# Patient Record
Sex: FEMALE | Race: WHITE
[De-identification: names, ages, dates, MRNs, and addresses within clinical notes are randomized per-mention and may not be internally consistent; named-entity substitution may affect disease eponyms.]

---

## 2019-04-22 ENCOUNTER — HOSPITAL ENCOUNTER (EMERGENCY)
Dept: HOSPITAL 74 - JER | Age: 24
LOS: 1 days | Discharge: HOME | End: 2019-04-23
Payer: COMMERCIAL

## 2019-04-22 VITALS — BODY MASS INDEX: 20.1 KG/M2
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
## 2019-04-22 DIAGNOSIS — R55: Primary | ICD-10-CM
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
## 2019-04-22 DIAGNOSIS — S09.8XXA: ICD-10-CM
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
## 2019-04-22 LAB
ALBUMIN SERPL-MCNC: 4.1 G/DL (ref 3.4–5)
ALP SERPL-CCNC: 64 U/L (ref 45–117)
ALT SERPL-CCNC: 16 U/L (ref 13–61)
ANION GAP SERPL CALC-SCNC: 9 MMOL/L (ref 8–16)
APPEARANCE UR: (no result)
AST SERPL-CCNC: 18 U/L (ref 15–37)
BASOPHILS # BLD: 0.8 % (ref 0–2)
BILIRUB SERPL-MCNC: 0.2 MG/DL (ref 0.2–1)
BILIRUB UR STRIP.AUTO-MCNC: NEGATIVE MG/DL
BUN SERPL-MCNC: 10 MG/DL (ref 7–18)
CALCIUM SERPL-MCNC: 8.8 MG/DL (ref 8.5–10.1)
CHLORIDE SERPL-SCNC: 104 MMOL/L (ref 98–107)
CO2 SERPL-SCNC: 24 MMOL/L (ref 21–32)
COLOR UR: YELLOW
CREAT SERPL-MCNC: 0.5 MG/DL (ref 0.55–1.3)
DEPRECATED RDW RBC AUTO: 13.2 % (ref 11.6–15.6)
EOSINOPHIL # BLD: 0.5 % (ref 0–4.5)
GLUCOSE SERPL-MCNC: 80 MG/DL (ref 74–106)
HCT VFR BLD CALC: 39.1 % (ref 32.4–45.2)
HGB BLD-MCNC: 13.6 GM/DL (ref 10.7–15.3)
KETONES UR QL STRIP: (no result)
LEUKOCYTE ESTERASE UR QL STRIP.AUTO: NEGATIVE
LYMPHOCYTES # BLD: 24.3 % (ref 8–40)
MCH RBC QN AUTO: 33.3 PG (ref 25.7–33.7)
MCHC RBC AUTO-ENTMCNC: 34.8 G/DL (ref 32–36)
MCV RBC: 95.9 FL (ref 80–96)
MONOCYTES # BLD AUTO: 8.3 % (ref 3.8–10.2)
NEUTROPHILS # BLD: 66.1 % (ref 42.8–82.8)
NITRITE UR QL STRIP: NEGATIVE
PH UR: 5.5 [PH] (ref 5–8)
PLATELET # BLD AUTO: 207 K/MM3 (ref 134–434)
PMV BLD: 8.8 FL (ref 7.5–11.1)
POTASSIUM SERPLBLD-SCNC: 4.2 MMOL/L (ref 3.5–5.1)
PROT SERPL-MCNC: 6.9 G/DL (ref 6.4–8.2)
PROT UR QL STRIP: NEGATIVE
PROT UR QL STRIP: NEGATIVE
RBC # BLD AUTO: 4.08 M/MM3 (ref 3.6–5.2)
SODIUM SERPL-SCNC: 137 MMOL/L (ref 136–145)
SP GR UR: 1.03 (ref 1.01–1.03)
UROBILINOGEN UR STRIP-MCNC: 1 MG/DL (ref 0.2–1)
WBC # BLD AUTO: 10.1 K/MM3 (ref 4–10)

## 2019-04-22 PROCEDURE — 3E033GC INTRODUCTION OF OTHER THERAPEUTIC SUBSTANCE INTO PERIPHERAL VEIN, PERCUTANEOUS APPROACH: ICD-10-PCS

## 2019-04-22 PROCEDURE — 3E0337Z INTRODUCTION OF ELECTROLYTIC AND WATER BALANCE SUBSTANCE INTO PERIPHERAL VEIN, PERCUTANEOUS APPROACH: ICD-10-PCS

## 2019-04-22 NOTE — PDOC
History of Present Illness





- General


Chief Complaint: Seizure


Stated Complaint: POSSIBLE SEIZURE


Time Seen by Provider: 04/22/19 22:18


History Source: Patient


Exam Limitations: No Limitations





- History of Present Illness


Initial Comments: 





04/22/19 22:42


22 yo F with a history of depression presents to the emergency department s/p 

tonic clonic episode with LOC at approximately 9:20 pm. Per the patient, she 

was sitting in a high bar stool when she leaned back, cracked her back, 

accidentally hit her head against the bar top. Afterwards her friend the 

katie stated she began having tonic clonic movements and fell off her chair 

and LOC for less than a minute. Prior to the LOC, the patient felt nauseous but 

denies the following antecedent symptoms: headache, visual changes, headache, 

vomiting, chest pain, SOB, and abdominal pain. After the fall, she states she 

is currently asymptomatic. Denies the following: fever, chills, hx of seizures, 

suicidal ideation, dysuria, hematuria, and diarrhea. 





Allergies: NKDA


Social: Denies tobacco, alcohol, and substance abuse


Meds: zoloft and welbutrin





Past History





- Past Medical History


Allergies/Adverse Reactions: 


 Allergies











Allergy/AdvReac Type Severity Reaction Status Date / Time


 


No Known Allergies Allergy   Verified 04/22/19 22:45














**Review of Systems





- Review of Systems


Able to Perform ROS?: Yes


Is the patient limited English proficient: No


Constitutional: No: Chills, Diaphoresis, Fever


HEENTM: No: Eye Pain, Recent change in vision, Ear Pain, Nose Pain, Nose 

Congestion, Throat Pain


Respiratory: No: Cough, Shortness of Breath, SOB with Exertion


Cardiac (ROS): No: Chest Pain, Lightheadedness, Palpitations, Syncope, Chest 

Tightness


ABD/GI: Yes: Nausea.  No: Constipated, Diarrhea, Poor Fluid Intake, Rectal 

Bleeding, Vomiting, Tarry Stools


: No: Burning, Dysuria, Discharge, Incontinence, Urgency


Musculoskeletal: No: Back Pain, Gout, Joint Pain, Neck Pain


Integumentary: Yes: Bruising (forehead).  No: Erythema, Flushing, Lesions, Lumps


Neurological: No: Headache, Numbness, Tingling, Tremors, Dizziness


Psychiatric: No: Frequent Crying, Stressors, Change in Appetite


Endocrine: No: Excessive Sweating


Hematologic/Lymphatic: No: Anemia





*Physical Exam





- Physical Exam


General Appearance: Yes: Nourished, Appropriately Dressed, Thin.  No: Apparent 

Distress


HEENT: positive: EOMI, ELLYN, Normal ENT Inspection, Normal Voice, Symmetrical, 

TMs Normal, Pharynx Normal, Hearing Grossly Normal, Other (raised bruises 

consistent with fall 1x on left forehead and other on right forehead. larger on 

left. no contusions or depressions throughout the rest of the skull. no 

tenderness to palpation in the neck midline and no paravertebral tenderness. ).

  negative: Pale Conjunctivae, Scleral Icterus (R), Scleral Icterus (L), Muffled

/Hoarse voice, Pharyngeal Erythema, Tonsillar Exudate, Tonsillar Erythema, 

Nasal Congestion, Sinus Tenderness, TM Bulging, TM Dull, TM Erythema, Excessive 

drooling


Neck: positive: Trachea midline, Supple.  negative: Tender, Lymphadenopathy (R)

, Lymphadenopathy (L)


Respiratory/Chest: positive: Lungs Clear, Normal Breath Sounds.  negative: 

Chest Tender, Respiratory Distress, Accessory Muscle Use, Crackles, Rales, 

Rhonchi, Stridor


Cardiovascular: positive: Regular Rhythm, Regular Rate, S1, S2.  negative: 

Systolic Murmur


Gastrointestinal/Abdominal: positive: Normal Bowel Sounds, Flat, Soft.  negative

: Tender, Protuberent, Distended, Rebound, Tenderness


Lymphatic: negative: Adenopathy


Musculoskeletal: positive: Normal Inspection.  negative: CVA Tenderness, 

Decreased Range of Motion


Extremity: positive: Normal Capillary Refill, Normal Inspection, Normal Range 

of Motion.  negative: Tender, Swelling, Calf Tenderness


Integumentary: positive: Normal Color, Dry, Warm.  negative: Swelling, 

Ecchymosis


Neurologic: positive: CNs II-XII NML intact, Fully Oriented, Alert, Normal Mood/

Affect, Normal Response, Motor Strength 5/5





ED Treatment Course





- LABORATORY


CBC & Chemistry Diagram: 


 04/22/19 22:40





 04/22/19 22:38





Medical Decision Making





- Medical Decision Making





04/22/19 23:02


22 yo F with a history of depression presents to the emergency department s/p 

tonic clonic episode with LOC at approximately 9:20 pm. 





Initial vitals:


 Initial Vital Signs











Temp Pulse Resp BP Pulse Ox


 


 98.1 F   71   19   102/71   97 


 


 04/22/19 22:05  04/22/19 22:05  04/22/19 22:05  04/22/19 22:05  04/22/19 22:05








Work up:


patient stated she had not had a chance to have her drink before she had the 

syncopal event. the patient presents to the emergency department with bruises 

on her forehead, but denies headache and other symptomatic complaints. patient 

will be given fluids and zofran. the patient did not have slurred speech and 

neurological exam within normal limits. 





orders: cbc, cmp, ua, urine culture, ekg, ct head, cxr





 Laboratory Tests











  04/22/19 04/22/19 04/22/19





  22:38 22:38 22:40


 


WBC    10.1 H


 


RBC    4.08


 


Hgb    13.6


 


Hct    39.1


 


MCV    95.9


 


MCH    33.3


 


MCHC    34.8


 


RDW    13.2


 


Plt Count    207


 


MPV    8.8


 


Absolute Neuts (auto)    6.7


 


Neutrophils %    66.1


 


Lymphocytes %    24.3


 


Monocytes %    8.3


 


Eosinophils %    0.5


 


Basophils %    0.8


 


Nucleated RBC %    0


 


Sodium   137 


 


Potassium   4.2 


 


Chloride   104 


 


Carbon Dioxide   24 


 


Anion Gap   9 


 


BUN   10 


 


Creatinine   0.5 L 


 


Creat Clearance w eGFR   151.58 


 


Random Glucose   80 


 


Calcium   8.8 


 


Total Bilirubin   0.2 


 


AST   18 


 


ALT   16 


 


Alkaline Phosphatase   64 


 


Creatine Kinase   73 


 


Troponin I   < 0.02 


 


Total Protein   6.9 


 


Albumin   4.1 


 


TSH   2.12 


 


Serum Pregnancy, Qual  Negative  


 


Urine Color   


 


Urine Appearance   


 


Urine pH   


 


Ur Specific Gravity   


 


Urine Protein   


 


Urine Glucose (UA)   


 


Urine Ketones   


 


Urine Blood   


 


Urine Nitrite   


 


Urine Bilirubin   


 


Urine Urobilinogen   


 


Ur Leukocyte Esterase   


 


Opiates Screen   


 


Methadone Screen   


 


Barbiturate Screen   


 


Phencyclidine Screen   


 


Ur Amphetamines Screen   


 


MDMA (Ecstasy) Screen   


 


Benzodiazepines Screen   


 


Cocaine Screen   


 


U Marijuana (THC) Screen   














  04/22/19 04/22/19





  22:45 22:49


 


WBC  


 


RBC  


 


Hgb  


 


Hct  


 


MCV  


 


MCH  


 


MCHC  


 


RDW  


 


Plt Count  


 


MPV  


 


Absolute Neuts (auto)  


 


Neutrophils %  


 


Lymphocytes %  


 


Monocytes %  


 


Eosinophils %  


 


Basophils %  


 


Nucleated RBC %  


 


Sodium  


 


Potassium  


 


Chloride  


 


Carbon Dioxide  


 


Anion Gap  


 


BUN  


 


Creatinine  


 


Creat Clearance w eGFR  


 


Random Glucose  


 


Calcium  


 


Total Bilirubin  


 


AST  


 


ALT  


 


Alkaline Phosphatase  


 


Creatine Kinase  


 


Troponin I  


 


Total Protein  


 


Albumin  


 


TSH  


 


Serum Pregnancy, Qual  


 


Urine Color   Yellow


 


Urine Appearance   Cloudy


 


Urine pH   5.5


 


Ur Specific Gravity   1.030


 


Urine Protein   Negative


 


Urine Glucose (UA)   Negative


 


Urine Ketones   Trace H


 


Urine Blood   Negative


 


Urine Nitrite   Negative


 


Urine Bilirubin   Negative


 


Urine Urobilinogen   1.0


 


Ur Leukocyte Esterase   Negative


 


Opiates Screen  Cancelled 


 


Methadone Screen  Cancelled 


 


Barbiturate Screen  Cancelled 


 


Phencyclidine Screen  Cancelled 


 


Ur Amphetamines Screen  Cancelled 


 


MDMA (Ecstasy) Screen  Cancelled 


 


Benzodiazepines Screen  Cancelled 


 


Cocaine Screen  Cancelled 


 


U Marijuana (THC) Screen  Cancelled 








CT head, cervical spine CT, and cxr pending. 


Patient is signed out to Dr. Eisenberg. 











04/22/19 23:51





04/22/19 23:51





04/23/19 00:04








*DC/Admit/Observation/Transfer


Diagnosis at time of Disposition: 


Syncope


Qualifiers:


 Syncope type: unspecified Qualified Code(s): R55 - Syncope and collapse








- Discharge Dispostion


Disposition: HOME


Condition at time of disposition: Fair


Decision to Admit order: No





- Referrals


Referrals: 


ON STAFF,NOT [Primary Care Provider] - 


[facility] [place] [Provider Group]


Jason Montanez MD [Staff Physician] - 


Honorio Adam DO [Staff Physician] - 





- Patient Instructions


Printed Discharge Instructions:  DI for Syncope in Adults (Fainting), DI for 

Seizure Disorder -- Adult


Additional Instructions: 


you were seen in the emergency department for your syncopal episode that you 

sustained today. please follow up with the neurologist referred to you in 1 

week after discharge for follow up care and management. in addition, your 

frequent syncopal episodes in the past should be evaluated by the neurologist. 

please follow up with the cardiologist in 1 week after discharge for follow up 

care and management. in addition, you were referred a primary medical physician 

that you can establish care with. please return to the emergency department if 

you have worsening symptoms or new concerning symptoms such as loss of 

consciousness, fevers, neck stiffness, speech slurring, and focal neurological 

deficits. thank you. 





- Post Discharge Activity


Forms/Work/School Notes:  Back to Work

## 2019-04-22 NOTE — PDOC
Documentation entered by Berta Adrian SCRIBE, acting as scribe for Becky Warren DO.








Becky Warren DO:  This documentation has been prepared by the 

Nguyễn kay Daisy, SCRIBE, under my direction and personally reviewed by me 

in its entirety.  I confirm that the documentation accurately reflects all work

, treatment, procedures, and medical decision making performed by me.  





Attending Attestation





- Resident


Resident Name: FranklinOleg





- ED Attending Attestation


I have performed the following: I have examined & evaluated the patient, The 

case was reviewed & discussed with the resident, I agree w/resident's findings 

& plan





- HPI


HPI: 





04/22/19 22:27


The patient is 23 year old female with a PMH of depression who presents for 

evaluation of witnessed tonic clonic movements at approximately 9:20PM today. 

Patient was sitting on a bar stool when she tried to crack her back and then 

went forward. Patient admits to injuring her head on the bar and friend at 

bedside witnessed tonic clonic movements. Patient subsequently fell off the 

stool and is unclear whether she injured her head again. Patient reports having 

syncopal episodes in the past when she had not eaten in a long time. She has 

not followed up with neurology in the past for these syncopal episodes. 





The patient denies weakness/numbness/tingling, chest pain, shortness of breath, 

headache and dizziness.


Denies fever, chills, nausea, vomit, diarrhea and constipation.


Denies dysuria, frequency, urgency and hematuria.








Allergies: NKDA 





- Physicial Exam


PE: 





04/22/19 22:35


Agrees with resident's exam. 





- Medical Decision Making





04/22/19 23:01


23-year-old female status post possible seizure with resulting trauma to the 

forehead


Patient is awake alert complaining only of localized headache


Plan for CT scan of the head and cervical spine with probable discharge home


It was discussed at length with the patient that she will likely require 

further evaluation by both neurology and cardiology to determine the exact 

cause of her symptoms


She is also agreed not to drive until further evaluation is performed.

## 2019-04-23 VITALS — TEMPERATURE: 98.7 F | DIASTOLIC BLOOD PRESSURE: 76 MMHG | HEART RATE: 65 BPM | SYSTOLIC BLOOD PRESSURE: 110 MMHG

## 2019-04-23 NOTE — EKG
Test Reason : 

Blood Pressure : ***/*** mmHG

Vent. Rate : 073 BPM     Atrial Rate : 073 BPM

   P-R Int : 110 ms          QRS Dur : 082 ms

    QT Int : 388 ms       P-R-T Axes : -05 075 057 degrees

   QTc Int : 427 ms

 

NORMAL SINUS RHYTHM

RSR' OR QR PATTERN IN V1 SUGGESTS RIGHT VENTRICULAR CONDUCTION DELAY

OTHERWISE NORMAL ECG

Confirmed by MD ART, LOREE ([id number]) on 4/23/2019 12:29:01 PM

 

Referred By:             Confirmed By:LOREE CORDOVA MD